# Patient Record
Sex: FEMALE | URBAN - METROPOLITAN AREA
[De-identification: names, ages, dates, MRNs, and addresses within clinical notes are randomized per-mention and may not be internally consistent; named-entity substitution may affect disease eponyms.]

---

## 2024-04-29 NOTE — PROGRESS NOTES
" Patient: Leon Chavez    [id number]  : 1955 -- AGE 69 y.o.    Provider: Geri HIDALGO CNP     Location [facility]   Service Date: 24              [facility]  New Visit Note      HISTORY OF PRESENT ILLNESS     The patient's referring provider is: No ref. provider found    HISTORY OF PRESENT ILLNESS   Leon Chavez is a 69 y.o. female with a history of GERD, postnasal drip and chronic cough who is a former smoker (10 pack years), who presents to a [facility] for an initial valuation for chronic cough.     I have independently interviewed and examined the patient in the office and reviewed available records.    Current History    On today's visit, the patient reports having a cough for going on for 5 years now.  States she coughs so hard she gags.  States she cannot sleep due to coughing all the time.  Sleeps in a recliner which does not help.  Sees pulmonology at [facility] who diagnosed her with asthma, PFT and MCCT are negative, she was started her on Symbicort and Ventolin, Says she had tried Symbicort and Ventolin a few years back with no improvement but has restarted 3 days ago and now has had improvement in cough.  Seen ENT for postnasal drip, was ordered Robyn pot and Flonase twice daily.  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  She reports anytime she puts in or takes out her hearing aid it causes her to cough so hard she gags. Has tried codeine cough syrup and Tessalon Perles for cough.  Takes Benadryl and Zyrtec for allergies.  She has seen GI and was diagnosed with silent reflux, is on omeprazole.  I was unclear as why she was here to see me since she is already seeing pulmonology at [facility], she says ENT from [facility] referred her here to [facility] for a \"cough clinic\" to help her stop coughing.    Previous pulmonary history: Asthma. Pneumonia and bronchitis    Inhalers/nebulized medications: " symbicort and ventolin    Hospitalization History: She has not been hospitalized over the last year for breathing related problem.    Sleep history: Denies snoring, apnea, feeling tired during the day or taking naps during the day.     ALLERGIES AND MEDICATIONS     ALLERGIES  Not on File    MEDICATIONS  No current outpatient medications on file.     No current facility-administered medications for this visit.         PAST HISTORY     PAST MEDICAL HISTORY  Asthma  Post nasal drip  GERD    PAST SURGICAL HISTORY  No past surgical history on file.    IMMUNIZATION HISTORY    There is no immunization history on file for this patient.    SOCIAL HISTORY  Tobacco Smoking: 15- 26 y/o- 1 ppd - 10 pack years  Illicit drugs: none  Alcohol consumption: 5 days/week - 12 oz of wine  Pets: 2 cats    OCCUPATIONAL/ENVIRONMENTAL HISTORY  Occupation: Retired  No known exposure to asbestos, silica, beryllium or inhaled metals.  No exposure to birds or exotic animals.    FAMILY HISTORY    No family history of pulmonary disease.  No family history of cancer.  No family history of autoimmune disorders.    REVIEW OF SYSTEMS     REVIEW OF SYSTEMS  Review of Systems    Constitutional: No fever, no chills, no night sweats.    Eyes: No double vision, no floaters, no dry eyes.   ENT: See HPI.   Neck: No neck stiffness.  Cardiovascular: No sharp chest pain, no heart racing, no leg swelling.  Respiratory: as noted in HPI.   Gastrointestinal: No nausea, no vomiting, no diarrhea.   Musculoskeletal: No joint pain, no back pain.   Integumentary: No rashes or sores.  Neurological: No dizziness, no headaches. Sleeping well.  Psychiatric: No mood changes.   Endocrine: No hot flashes, no cold intolerance, weight is stable.  Hematologic: No easy bruising or bleeding.    PHYSICAL EXAM     VITAL SIGNS:   Vitals:    05/09/24 1412   BP: 132/89   Pulse: 88   Temp: 36.3 °C (97.3 °F)   SpO2: 94%          CURRENT WEIGHT: Body mass index is 38.78 kg/m².    PREVIOUS  "WEIGHTS:  Wt Readings from Last 3 Encounters:   No data found for Wt       Physical Exam    Constitutional: General appearance: Alert and oriented.  No acute distress. Well developed, well nourished.  Head and face: Symmetric  ENT: external inspection of ear and nose normal. No intranasal polyps. No oropharyngeal exudates.    Oropharynx: normal   Neck: supple, no lymphadenopathy  Pulmonary: Chest is normal. No increased work of breathing or signs of respiratory distress. Clear to auscultation bilaterally - no crackles, wheezing, or rhonchi.   Cardiovascular: Heart rate and rhythm normal. Normal S1, S2 - no murmurs, gallops, or pericardial rub.   Abdomen: Soft, non tender, +BS  Extremities: No edema. No clubbing or cyanosis of the fingernails.    Neurologic: Moves all four extremities   MSK: Normal movements of extremities. Gait normal   Psychiatric: Intact judgement and insight.    RESULTS/DATA     Pulmonary Function Test Results     No testing done.    Chest Radiograph     No testing done.      Chest CT Scan     No testing done.      Echocardiogram     No testing done.       Labwork   Complete Blood Count  No results found for: \"WBC\", \"HGB\", \"HCT\", \"MCV\", \"PLT\"    Peripheral Eosinophil Count/Percentage:   No results found for: \"EOSABS\", \"EOSPCT\"    Serum Immunoglobulin E:    No results found for: \"IGE\"     ASSESSMENT/PLAN   Ms. Chavez is a 69 y.o. female, with a history of GERD, postnasal drip and chronic cough who is a former smoker (10 pack years), who presents to a [facility] for an initial valuation for chronic cough.     Problem List and Orders  Problem List Items Addressed This Visit    None  Visit Diagnoses       Chronic cough    -  Primary            Assessment and Plan / Recommendations:  Problem List Items Addressed This Visit    None     Chronic cough  - refer back to pulmonologist and ENT at [facility]. Gets all of her care through [facility].    Follow up as needed.    If you " have any questions please call the office [phone]    Thank you for visiting the Pulmonary clinic today!   Geri Tee CNP  [phone]

## 2024-05-09 ENCOUNTER — OFFICE VISIT (OUTPATIENT)
Dept: PULMONOLOGY | Facility: CLINIC | Age: 69
End: 2024-05-09
Payer: MEDICARE

## 2024-05-09 VITALS
TEMPERATURE: 97.3 F | OXYGEN SATURATION: 94 % | WEIGHT: 212 LBS | HEIGHT: 62 IN | BODY MASS INDEX: 39.01 KG/M2 | DIASTOLIC BLOOD PRESSURE: 89 MMHG | SYSTOLIC BLOOD PRESSURE: 132 MMHG | HEART RATE: 88 BPM

## 2024-05-09 DIAGNOSIS — R05.3 CHRONIC COUGH: Primary | ICD-10-CM

## 2024-05-09 PROCEDURE — 1159F MED LIST DOCD IN RCRD: CPT

## 2024-05-09 PROCEDURE — 99204 OFFICE O/P NEW MOD 45 MIN: CPT

## 2024-05-09 RX ORDER — ESOMEPRAZOLE MAGNESIUM 40 MG/1
40 CAPSULE, DELAYED RELEASE ORAL
COMMUNITY

## 2024-05-09 RX ORDER — MULTIVITAMIN/IRON/FOLIC ACID 18MG-0.4MG
1 TABLET ORAL DAILY
COMMUNITY

## 2024-05-09 RX ORDER — FAMOTIDINE 20 MG/1
TABLET, FILM COATED ORAL
COMMUNITY

## 2024-05-09 RX ORDER — BUDESONIDE AND FORMOTEROL FUMARATE DIHYDRATE 80; 4.5 UG/1; UG/1
2 AEROSOL RESPIRATORY (INHALATION)
COMMUNITY

## 2024-05-09 RX ORDER — CLONIDINE HYDROCHLORIDE 0.2 MG/1
TABLET ORAL 2 TIMES DAILY
COMMUNITY

## 2024-05-09 RX ORDER — FLUTICASONE PROPIONATE 50 MCG
1 SPRAY, SUSPENSION (ML) NASAL DAILY
COMMUNITY

## 2024-05-09 RX ORDER — VENLAFAXINE 37.5 MG/1
37.5 TABLET ORAL 2 TIMES DAILY
COMMUNITY